# Patient Record
(demographics unavailable — no encounter records)

---

## 2025-04-22 NOTE — PLAN
[FreeTextEntry1] : 41 yo  at 14 weeks with plans for DA, possible DE at [facility] on 25    1. Dilation and Aspiration "Suction D&C" - All available medical records reviewed - All consents signed today, all questions/concerns addressed - Patient offered pamphlet for support services    2. Surgery scheduling - Patient to be precertified for D+A - D+A scheduled for  [facility] - PSTs- not indicated   3. ID/Neuro/cervical prep - GC/CT - will offer in person - doxycycline 200 mg in OR - Reviewed Ibuprofen 600 mg po q 6 prn   4. Labs/Blood type  - CBC at [facility] labs prior to procedure - TS on arrival - Rhogam pending results   5. Contraception/Conception - Patient counseled on all contraceptive options - will revisit tomorrow   6. Post-op - Post-operative follow-up phone call virtual visit to be scheduled in 2 weeks - pre- and Post-operative instruction sheet given, reviewed bleeding and infection precautions - Provided 24 hour contact phone number - All questions/concerns of patient addressed to their satisfaction

## 2025-04-22 NOTE — PLAN
[FreeTextEntry1] : 39 yo  at 14 weeks with plans for DA, possible DE at [facility] on 25    1. Dilation and Aspiration "Suction D&C" - All available medical records reviewed - All consents signed today, all questions/concerns addressed - Patient offered pamphlet for support services    2. Surgery scheduling - Patient to be precertified for D+A - D+A scheduled for  [facility] - PSTs- not indicated   3. ID/Neuro/cervical prep - GC/CT - will offer in person - doxycycline 200 mg in OR - Reviewed Ibuprofen 600 mg po q 6 prn   4. Labs/Blood type  - CBC at [facility] labs prior to procedure - TS on arrival - Rhogam pending results   5. Contraception/Conception - Patient counseled on all contraceptive options - will revisit tomorrow   6. Post-op - Post-operative follow-up phone call virtual visit to be scheduled in 2 weeks - pre- and Post-operative instruction sheet given, reviewed bleeding and infection precautions - Provided 24 hour contact phone number - All questions/concerns of patient addressed to their satisfaction

## 2025-04-22 NOTE — HISTORY OF PRESENT ILLNESS
[FreeTextEntry1] : This visit was provided via telehealth using 2-way audio and visual technology. The patient, LUCY ESTES and provider, Devyn Avilez, were both located in [facility] and both participated in the telehealth encounter. Verbal consent 2025 by, patient, LUCY ESTES. Pt location: Home, [place], [place] Provider location: office, [address] [place] [address]   41 yo  (c-secx4) presenting for  consultation. 14 weeks by EDC 10/21/25, inconsistent with LMP 25. Pt referred from [facility] for deep sedation and hx of higher order .  I have independently reviewed and interpreted ultrasound performed on 2025. This ultrasound places the pregnancy at 14 weeks today by first trimester CRL inconsistent with LMP.  All: NKDA Meds: denies Obhx: c-secx4- uncomplicated, SAB Gynhx: denies PMH/PSH: as above SH: social etoh, rare tobacco use Pt does not have anyone in her support [place] who knows she is having an   Dilation and Aspiration Counseling   Risks of D&A includin. Infection: Patient was counseled on risk of infection and the use of prophylactic antibiotics, signs/symptoms of pre- and post-operative infection were reviewed. 2. Hemorrhage: Patient was counseled on the risk of hemorrhage, possibly requiring blood (and/or blood products) transfusion, management including use of but not limited to uterotonic medications. PT HAS NO OBJECTIONS TO BLOOD TRANSFUSION OR RECEIVING BLOOD PRODUCTS. 3. Injury/Perforation:  Risk of injury to vagina, cervix, uterus reviewed. Patient was counseled on the risk of uterine perforation with/without need for laparoscopy/laparotomy with/without injury to adjacent organs such as bowel/bladder. Reviewed risk of hysterectomy. 4. Risk of retained products of conception  with/without need for medication or suction procedure to empty the uterus.     They understand these risks and agree to above.   Need for cervical ripening with misoprostol/mifepristone was also discussed; the accompanying side effects of cramping/GI upset/fever/bleeding with misoprostol and possibility of spotting with mifepristone reviewed. Logistics of prescription for misoprostol vs. in person appointment of mifepristone reviewed. They are opting for mifepristone.   The patient does have any underlying medical conditions that would increase their risks associated with the procedure. These conditions are: higher order  section. The additional risks of the procedure are: bleeding with/without need for intervention such as hysterectomy.   The patient also understands it is their responsibility to bring to the attention of their physician any unusual symptoms following the procedure and to report to follow-up examinations.   They are sure of their decision and deny any coercion from family, friends or healthcare providers. The patient had the opportunity to ask questions and all questions were answered.  CONTRACEPTION COUNSELING Pt is interested in permanent contraception. I explained I cannot do this at time of . We reviewed larc methods and handout emailed to her for review.

## 2025-04-22 NOTE — HISTORY OF PRESENT ILLNESS
[FreeTextEntry1] : This visit was provided via telehealth using 2-way audio and visual technology. The patient, LUCY ESTES and provider, Devyn Avilez, were both located in [facility] and both participated in the telehealth encounter. Verbal consent 2025 by, patient, LUCY ESTES. Pt location: Home, [place], [place] Provider location: office, [address] [place] [address]   39 yo  (c-secx4) presenting for  consultation. 14 weeks by EDC 10/21/25, inconsistent with LMP 25. Pt referred from [facility] for deep sedation and hx of higher order .  I have independently reviewed and interpreted ultrasound performed on 2025. This ultrasound places the pregnancy at 14 weeks today by first trimester CRL inconsistent with LMP.  All: NKDA Meds: denies Obhx: c-secx4- uncomplicated, SAB Gynhx: denies PMH/PSH: as above SH: social etoh, rare tobacco use Pt does not have anyone in her support [place] who knows she is having an   Dilation and Aspiration Counseling   Risks of D&A includin. Infection: Patient was counseled on risk of infection and the use of prophylactic antibiotics, signs/symptoms of pre- and post-operative infection were reviewed. 2. Hemorrhage: Patient was counseled on the risk of hemorrhage, possibly requiring blood (and/or blood products) transfusion, management including use of but not limited to uterotonic medications. PT HAS NO OBJECTIONS TO BLOOD TRANSFUSION OR RECEIVING BLOOD PRODUCTS. 3. Injury/Perforation:  Risk of injury to vagina, cervix, uterus reviewed. Patient was counseled on the risk of uterine perforation with/without need for laparoscopy/laparotomy with/without injury to adjacent organs such as bowel/bladder. Reviewed risk of hysterectomy. 4. Risk of retained products of conception  with/without need for medication or suction procedure to empty the uterus.     They understand these risks and agree to above.   Need for cervical ripening with misoprostol/mifepristone was also discussed; the accompanying side effects of cramping/GI upset/fever/bleeding with misoprostol and possibility of spotting with mifepristone reviewed. Logistics of prescription for misoprostol vs. in person appointment of mifepristone reviewed. They are opting for mifepristone.   The patient does have any underlying medical conditions that would increase their risks associated with the procedure. These conditions are: higher order  section. The additional risks of the procedure are: bleeding with/without need for intervention such as hysterectomy.   The patient also understands it is their responsibility to bring to the attention of their physician any unusual symptoms following the procedure and to report to follow-up examinations.   They are sure of their decision and deny any coercion from family, friends or healthcare providers. The patient had the opportunity to ask questions and all questions were answered.  CONTRACEPTION COUNSELING Pt is interested in permanent contraception. I explained I cannot do this at time of . We reviewed larc methods and handout emailed to her for review.

## 2025-04-23 NOTE — HISTORY OF PRESENT ILLNESS
[FreeTextEntry1] : 39 yo P4 (c-secx4) presenting for mifepristone and contraception counseling prior to  tomorrow at 14w1d today.  Pt is interested in hormonal iud.  The patient was counseled on the risks, benefits and alternatives to the LNG-IUS.  The patient understand the risks of infection, bleeding and trauma (specifically to the vagina, cervix, uterus, ovaries, fallopian tubes, bowel, bladder) and the small chance of IUD embedment, incorrect placement, incomplete removal and uterine perforation.  They voiced understanding that if uterine perforation, embedment or incomplete removal occurs that laparoscopy or hysteroscopy might be necessary to remove the IUD, and may or may not be successful in IUD removal. Risk of expulsion reviewed.  The patient was counseled on potential side effects including changes in bleeding, specifically irregular bleeding in the first 3-6 months followed by increased likelihood of amenorrhea.  They understand the need to follow up for a string check if any problems.  All questions were answered.

## 2025-04-23 NOTE — PLAN
[FreeTextEntry1] : Booking sheet resent to include Mirena IUD pre and post op instructions reviewed recovery folder reviewed all consents signed all questions answered.